# Patient Record
Sex: FEMALE | Race: WHITE
[De-identification: names, ages, dates, MRNs, and addresses within clinical notes are randomized per-mention and may not be internally consistent; named-entity substitution may affect disease eponyms.]

---

## 2019-07-07 ENCOUNTER — HOSPITAL ENCOUNTER (EMERGENCY)
Dept: HOSPITAL 10 - FTE | Age: 44
Discharge: HOME | End: 2019-07-07
Payer: COMMERCIAL

## 2019-07-07 ENCOUNTER — HOSPITAL ENCOUNTER (EMERGENCY)
Dept: HOSPITAL 54 - ER | Age: 44
Discharge: LEFT BEFORE DISCHARGE | End: 2019-07-07
Payer: COMMERCIAL

## 2019-07-07 ENCOUNTER — HOSPITAL ENCOUNTER (EMERGENCY)
Dept: HOSPITAL 91 - FTE | Age: 44
Discharge: HOME | End: 2019-07-07
Payer: COMMERCIAL

## 2019-07-07 VITALS — HEART RATE: 94 BPM | SYSTOLIC BLOOD PRESSURE: 102 MMHG | RESPIRATION RATE: 16 BRPM | DIASTOLIC BLOOD PRESSURE: 63 MMHG

## 2019-07-07 VITALS
WEIGHT: 206.79 LBS | HEIGHT: 63 IN | BODY MASS INDEX: 36.64 KG/M2 | HEIGHT: 63 IN | BODY MASS INDEX: 36.64 KG/M2 | WEIGHT: 206.79 LBS

## 2019-07-07 DIAGNOSIS — K62.89: Primary | ICD-10-CM

## 2019-07-07 DIAGNOSIS — Z53.21: Primary | ICD-10-CM

## 2019-07-07 DIAGNOSIS — Z87.891: ICD-10-CM

## 2019-07-07 LAB
ADD MAN DIFF?: NO
ADD UMIC: NO
ALANINE AMINOTRANSFERASE: 16 IU/L (ref 13–69)
ALBUMIN/GLOBULIN RATIO: 1.24
ALBUMIN: 4.6 G/DL (ref 3.3–4.9)
ALKALINE PHOSPHATASE: 52 IU/L (ref 42–121)
ANION GAP: 9 (ref 5–13)
ASPARTATE AMINO TRANSFERASE: 16 IU/L (ref 15–46)
BASOPHIL #: 0 10^3/UL (ref 0–0.1)
BASOPHILS %: 0.3 % (ref 0–2)
BILIRUBIN,DIRECT: 0 MG/DL (ref 0–0.2)
BILIRUBIN,TOTAL: 0.3 MG/DL (ref 0.2–1.3)
BLOOD UREA NITROGEN: 13 MG/DL (ref 7–20)
CALCIUM: 9.7 MG/DL (ref 8.4–10.2)
CARBON DIOXIDE: 27 MMOL/L (ref 21–31)
CHLORIDE: 107 MMOL/L (ref 97–110)
CREATININE: 0.66 MG/DL (ref 0.44–1)
EOSINOPHILS #: 0.1 10^3/UL (ref 0–0.5)
EOSINOPHILS %: 1.1 % (ref 0–7)
GLOBULIN: 3.7 G/DL (ref 1.3–3.2)
GLUCOSE: 98 MG/DL (ref 70–220)
HEMATOCRIT: 36.7 % (ref 37–47)
HEMOGLOBIN: 12.1 G/DL (ref 12–16)
IMMATURE GRANS #M: 0.09 10^3/UL (ref 0–0.03)
IMMATURE GRANS % (M): 0.8 % (ref 0–0.43)
LIPASE: 76 U/L (ref 23–300)
LYMPHOCYTES #: 2.4 10^3/UL (ref 0.8–2.9)
LYMPHOCYTES %: 20.6 % (ref 15–51)
MEAN CORPUSCULAR HEMOGLOBIN: 27.6 PG (ref 29–33)
MEAN CORPUSCULAR HGB CONC: 33 G/DL (ref 32–37)
MEAN CORPUSCULAR VOLUME: 83.6 FL (ref 82–101)
MEAN PLATELET VOLUME: 11 FL (ref 7.4–10.4)
MONOCYTE #: 0.6 10^3/UL (ref 0.3–0.9)
MONOCYTES %: 4.8 % (ref 0–11)
NEUTROPHIL #: 8.4 10^3/UL (ref 1.6–7.5)
NEUTROPHILS %: 72.4 % (ref 39–77)
NUCLEATED RED BLOOD CELLS #: 0 10^3/UL (ref 0–0)
NUCLEATED RED BLOOD CELLS%: 0 /100WBC (ref 0–0)
PLATELET COUNT: 317 10^3/UL (ref 140–415)
POTASSIUM: 4.4 MMOL/L (ref 3.5–5.1)
RED BLOOD COUNT: 4.39 10^6/UL (ref 4.2–5.4)
RED CELL DISTRIBUTION WIDTH: 15.5 % (ref 11.5–14.5)
SODIUM: 143 MMOL/L (ref 135–144)
TOTAL PROTEIN: 8.3 G/DL (ref 6.1–8.1)
UR ASCORBIC ACID: 40 MG/DL
UR BILIRUBIN (DIP): NEGATIVE MG/DL
UR BLOOD (DIP): NEGATIVE MG/DL
UR CLARITY: (no result)
UR COLOR: YELLOW
UR GLUCOSE (DIP): NEGATIVE MG/DL
UR KETONES (DIP): NEGATIVE MG/DL
UR LEUKOCYTE ESTERASE (DIP): NEGATIVE LEU/UL
UR MUCUS: (no result) /HPF
UR NITRITE (DIP): NEGATIVE MG/DL
UR PH (DIP): 5 (ref 5–9)
UR RBC: 1 /HPF (ref 0–5)
UR SPECIFIC GRAVITY (DIP): 1.01 (ref 1–1.03)
UR SQUAMOUS EPITHELIAL CELL: (no result) /HPF
UR TOTAL PROTEIN (DIP): NEGATIVE MG/DL
UR UROBILINOGEN (DIP): NEGATIVE MG/DL
UR WBC: 1 /HPF (ref 0–5)
WHITE BLOOD COUNT: 11.6 10^3/UL (ref 4.8–10.8)

## 2019-07-07 PROCEDURE — 83690 ASSAY OF LIPASE: CPT

## 2019-07-07 PROCEDURE — 80053 COMPREHEN METABOLIC PANEL: CPT

## 2019-07-07 PROCEDURE — 85025 COMPLETE CBC W/AUTO DIFF WBC: CPT

## 2019-07-07 PROCEDURE — 96375 TX/PRO/DX INJ NEW DRUG ADDON: CPT

## 2019-07-07 PROCEDURE — 36415 COLL VENOUS BLD VENIPUNCTURE: CPT

## 2019-07-07 PROCEDURE — 96374 THER/PROPH/DIAG INJ IV PUSH: CPT

## 2019-07-07 PROCEDURE — 81003 URINALYSIS AUTO W/O SCOPE: CPT

## 2019-07-07 PROCEDURE — 81001 URINALYSIS AUTO W/SCOPE: CPT

## 2019-07-07 PROCEDURE — 99285 EMERGENCY DEPT VISIT HI MDM: CPT

## 2019-07-07 PROCEDURE — 84703 CHORIONIC GONADOTROPIN ASSAY: CPT

## 2019-07-07 PROCEDURE — 74177 CT ABD & PELVIS W/CONTRAST: CPT

## 2019-07-07 RX ADMIN — VASOPRESSIN 1 ML/2.7 SEC: 20 INJECTION, SOLUTION INTRAMUSCULAR; SUBCUTANEOUS at 19:34

## 2019-07-07 RX ADMIN — MORPHINE SULFATE 1 MG: 2 INJECTION, SOLUTION INTRAMUSCULAR; INTRAVENOUS at 18:09

## 2019-07-07 RX ADMIN — SODIUM CHLORIDE 1 ML: 9 INJECTION, SOLUTION INTRAMUSCULAR; INTRAVENOUS; SUBCUTANEOUS at 19:34

## 2019-07-07 RX ADMIN — IOHEXOL 1 ML: 300 INJECTION, SOLUTION INTRAVENOUS at 19:34

## 2019-07-07 RX ADMIN — ONDANSETRON HYDROCHLORIDE 1 MG: 2 INJECTION, SOLUTION INTRAMUSCULAR; INTRAVENOUS at 18:09

## 2019-07-07 RX ADMIN — THIAMINE HYDROCHLORIDE 1 MLS/HR: 100 INJECTION, SOLUTION INTRAMUSCULAR; INTRAVENOUS at 18:09

## 2019-07-07 NOTE — ERD
ER Documentation


Chief Complaint


Chief Complaint





c/o red rectal bleed for a couple of days, referred to ER by urgent care





HPI


44-year-old female, presents the emergency department, complaining of 2 days 


with rectal pain, associated with rectal bleeding when passing stools.  The pain


is sharp, constant, 7/10, associated with subjective fever and general malaise. 


The patient was seen at an urgent care and referred to the ER for further 


evaluation.  The patient denies abdominal pain, no nausea or vomiting.  No 


history of previous episodes.  In regards of her medical history, the patient 


had a hysterectomy and she smokes 1 pack of cigarettes per day during the last 


20 years.





ROS


All systems reviewed and are negative except as per history of present illness.





Medications


Home Meds


Active Scripts


Hydrocortisone* Topical (Hydrocortisone* Topical) 2.5%-28.3 Gm Cream..g., 1 


APPLIC TOP BID for 7 Days, #1 TUB


   Prov:WILSON GARCIA MD         19


Acetaminophen* (Tylenol*) 325 Mg Tablet, 2 TAB PO Q6 PRN for PAIN AND OR 


ELEVATED TEMP, #20 TAB


   Prov:WILSON GARCIA MD         19


Ibuprofen* (Motrin*) 400 Mg Tab, 400 MG PO Q6H PRN for PAIN AND OR ELEVATED 


TEMP, #20 TAB


   Prov:WILSON GARCIA MD         19





Allergies


Allergies:  


Coded Allergies:  


     No Known Allergy (Unverified , 19)





PMhx/Soc


Medical and Surgical Hx:  pt denies Medical Hx


History of Surgery:  Yes (hysterectomy, breast augmentation)





Physical Exam


Vitals





Vital Signs


  Date      Temp  Pulse  Resp  B/P (MAP)   Pulse Ox  O2          O2 Flow    FiO2


Time                                                 Delivery    Rate


    19  98.3     94    16      102/63        98  Room Air


     20:32                           (76)


    19  98.7    100    20      133/80        97


     17:18                           (97)





Physical Exam


Const:   No acute distress


Head:   Atraumatic 


Eyes:    Normal Conjunctiva


ENT:    Normal External Ears, Nose and Mouth.


Neck:               Full range of motion. No meningismus.


Resp:   Clear to auscultation bilaterally


Cardio:   Regular rate and rhythm, no murmurs


Abd:    Soft, non tender, non distended. Normal bowel sounds


Rectal: Normal inspection, skin tags, no evidence of thrombosed hemorrhoid, no 


external masses, erythema or fluctuance.


Skin:   No petechiae or rashes


Back:   No midline or flank tenderness


Ext:    No cyanosis, or edema


Neur:   Awake and alert


Psych:    Normal Mood and Affect


Result Diagram:  


19





Results 24 hrs





Laboratory Tests


       Test
                                19
18:03     19
18:08


       Urine Pregnancy Test                 NEGATIVE


       White Blood Count                                    11.6 10^3/ul


       Red Blood Count                                      4.39 10^6/ul


       Hemoglobin                                              12.1 g/dl


       Hematocrit                                                 36.7 %


       Mean Corpuscular Volume                                   83.6 fl


       Mean Corpuscular Hemoglobin                               27.6 pg


       Mean Corpuscular Hemoglobin
Concent  
                 33.0 g/dl 



       Red Cell Distribution Width                                15.5 %


       Platelet Count                                        317 10^3/UL


       Mean Platelet Volume                                      11.0 fl


       Immature Granulocytes %                                   0.800 %


       Neutrophils %                                              72.4 %


       Lymphocytes %                                              20.6 %


       Monocytes %                                                 4.8 %


       Eosinophils %                                               1.1 %


       Basophils %                                                 0.3 %


       Nucleated Red Blood Cells %                           0.0 /100WBC


       Immature Granulocytes #                             0.090 10^3/ul


       Neutrophils #                                         8.4 10^3/ul


       Lymphocytes #                                         2.4 10^3/ul


       Monocytes #                                           0.6 10^3/ul


       Eosinophils #                                         0.1 10^3/ul


       Basophils #                                           0.0 10^3/ul


       Nucleated Red Blood Cells #                           0.0 10^3/ul


       Urine Color                                        YELLOW


       Urine Clarity
                       
             SLIGHTLY
CLOUDY


       Urine pH                                                      5.0


       Urine Specific Gravity                                      1.013


       Urine Ketones                                      NEGATIVE mg/dL


       Urine Nitrite                                      NEGATIVE mg/dL


       Urine Bilirubin                                    NEGATIVE mg/dL


       Urine Urobilinogen                                 NEGATIVE mg/dL


       Urine Leukocyte Esterase
            
             NEGATIVE
Liz/ul


       Urine Microscopic RBC                                      1 /HPF


       Urine Microscopic WBC                                      1 /HPF


       Urine Squamous Epithelial
Cells      
             FEW /HPF 



       Urine Mucus                                        FEW /HPF


       Urine Hemoglobin                                   NEGATIVE mg/dL


       Urine Glucose                                      NEGATIVE mg/dL


       Urine Total Protein                                NEGATIVE mg/dl


       Sodium Level                                           143 mmol/L


       Potassium Level                                        4.4 mmol/L


       Chloride Level                                         107 mmol/L


       Carbon Dioxide Level                                    27 mmol/L


       Anion Gap                                                       9


       Blood Urea Nitrogen                                      13 mg/dl


       Creatinine                                             0.66 mg/dl


       Est Glomerular Filtrat Rate
mL/min   
             > 60 mL/min 



       Glucose Level                                            98 mg/dl


       Calcium Level                                           9.7 mg/dl


       Total Bilirubin                                         0.3 mg/dl


       Direct Bilirubin                                       0.00 mg/dl


       Indirect Bilirubin                                      0.3 mg/dl


       Aspartate Amino Transf
(AST/SGOT)    
                   16 IU/L 



       Alanine Aminotransferase
(ALT/SGPT)  
                   16 IU/L 



       Alkaline Phosphatase                                      52 IU/L


       Total Protein                                            8.3 g/dl


       Albumin                                                  4.6 g/dl


       Globulin                                                3.70 g/dl


       Albumin/Globulin Ratio                                       1.24


       Lipase                                                     76 U/L





Current Medications


 Medications
   Dose
          Sig/Rodrigo
       Start Time
   Status  Last


 (Trade)       Ordered        Route
 PRN     Stop Time              Admin
Dose


                              Reason                                Admin


 Sodium         1,000 ml @ 
   Q1H STAT
      19        DC            19


Chloride       1,000 mls/hr   IV
            17:52
 19                18:09



                                             18:51


 Morphine       2 mg           ONCE  STAT
    19        DC            19


Sulfate
                      IV
            17:52
 19                18:09



(morphine)                                   17:58


 Ondansetron    4 mg           ONCE  STAT
    19        DC            19


HCl
  (Zofran                 IV
            17:52
 19                18:09



Inj)                                         17:58


 IV Flush
      10 ml          STK-MED        19        DC            19


(NS 10 ml)                    ONCE
 .ROUTE
  19:20
 19                19:34



                                             19:21


 Sodium         100 ml @ ud    STK-MED        19        DC            19


Chloride                      ONCE
 .ROUTE
  19:20
 19                19:34



                                             19:21


 Iohexol
       150 ml         STK-MED        19        DC            19


(Omnipaque                    ONCE
 .ROUTE
  19:20
 19                19:34



300mg/
 ml)                                  19:21





Patient: MARY MADDOX   : 1975   Age: 44  Sex: F                     


  


MR #:    [id number]   [id number]    DOS: 19 1752


Ordering MD: WILSON GARCIA MD   Location:  [facility]   Room/Bed:            


                               





PROCEDURE:   CT Abdomen and Pelvis with contrast. 


 


CLINICAL INDICATION:   Rectal pain. Evaluate for abscess/proctitis.


 


TECHNIQUE:   Multiple contiguous axial CT images of the abdomen and pelvis were 


obtained following the administration of 105 cc of Omnipaque-300.  Coronal and 


sagittal reconstructions were also performed. DICOM images are available. 


CTDIvol (mGy):  18.97;  Total Exam DLP (mGy-cm):  1217.40.


 


One or more of the following dose reduction techniques were utilized:


 


-  Automated exposure control.


-  Adjustment of the mA and/or kV according to patient size.


-  Use of iterative reconstruction technique.


 


COMPARISON:   None. 


 


FINDINGS:


 


Lower thorax: Mild atelectatic changes are seen within the lung bases.


 


Liver: Uniform parenchymal enhancement. Mild diffuse fatty infiltration. 


Enlarged measuring 26.4 cm in a craniocaudal dimension. Patent portal vein.


 


Biliary: Normal gallbladder. No biliary dilatation.


 


Spleen: Normal.


 


Pancreas: Normal.


 


Adrenal Glands: Normal.


 


Urinary: The bladder is collapsed with mild concentric wall thickening.


 


Gastrointestinal: The stomach is collapsed. The small intestines are un


remarkable. The appendix is normal. Sigmoid diverticulosis is observed. The 


rectosigmoid colon is collapsed. There is no barb rectal edema. Scattered few 


tiny perirectal lymph nodes are observed. The perianal soft tissues are 


symmetric. There is no discrete rim enhancing fluid collection to suggest 


abscess. The ischioanal and ischiorectal fossa are clear.


 


Lymph nodes: Shoddy lymph nodes throughout the retroperitoneum.


 


Vascular: Normal.


 


Peritoneum/mesentery:  No free fluid or free air.


 


Reproductive organs: Hysterectomy. The adnexa are unremarkable.


 


Musculoskeletal: Unremarkable.


 


Additional comments: Bilateral breast implants are in place.


 


 


IMPRESSION:


 


No evidence of abdominopelvic mass, lymphadenopathy or acute inflammatory 


pathology. Specifically, there is no evidence of proctitis or 


perirectal/perianal abscess. The perianal soft tissues are symmetric without 


evidence of discrete rim enhancing fluid collection to suggest the presence of 


an abscess. Correlate with rectal exam.


 


Hepatomegaly with steatosis.


 


Sigmoid diverticulosis. No evidence of diverticulitis.





Procedures/MDM


Vital signs stable, patient hemodynamically stable.


Differential diagnosis include but not limited to: Hemorrhoids, rectal fissure, 


inflammatory bowel disease, anal fistula, AV malformation, colitis.  Low 


suspicion for active rectal bleeding.


Physical examination and clinical presentation consistent most likely with 


resolved rectal bleeding most likely secondary to internal hemorrhoid.


During the ED course the patient remained stable, no new complaints. 


Results and clinical impression discussed with the father who agrees with 


management. The patient is stable to be discharged home with instructions to 


follow up with the primary care provider in the next 48h.  If symptoms persist, 


worsen or new symptoms develop, then patient should return to the ED immediat


ely.





Instructions explained and given directly by me to the patient with 


acknowledgment and demonstrated understanding.





Disclaimer: Inadvertent spelling and grammatical errors are likely due to EHR/


dictation software use and do not reflect on the overall quality of patient 


care. Also, please note that the electronic time recorded on this note does not 


necessarily reflect the actual time of the patient encounter.





Departure


Diagnosis:  


   Primary Impression:  


   Rectal pain


Condition:  Stable





Additional Instructions:  


Thank you very much for allowing us to participate in your care. 


Your health and safety is our top priority at [facility].


 


The evaluation in the emergency department has been done to rule out an acute 


emergency.  Chronic, non-life-threatening conditions may have not been 


evaluated; therefore, you need to follow up with a primary care provider in the 


next 48h.  If symptoms persist, worsen or new symptoms develop, then patient 


should return to the ED immediately.





Call your primary care doctor TOMORROW for an appointment during the next 2-4 


days and bring all the information provided. 





Have prescriptions filled and follow precisely the directions on the label. 





If the symptoms get worse and your provider is unavailable, return to the 


Emergency Department immediately.











WILSON GARCIA MD       2019 17:55